# Patient Record
Sex: FEMALE | Race: BLACK OR AFRICAN AMERICAN | NOT HISPANIC OR LATINO | Employment: UNEMPLOYED | URBAN - METROPOLITAN AREA
[De-identification: names, ages, dates, MRNs, and addresses within clinical notes are randomized per-mention and may not be internally consistent; named-entity substitution may affect disease eponyms.]

---

## 2017-08-14 ENCOUNTER — HOSPITAL ENCOUNTER (EMERGENCY)
Facility: HOSPITAL | Age: 52
Discharge: HOME | End: 2017-08-14
Payer: MEDICAID

## 2017-08-14 VITALS
WEIGHT: 125.63 LBS | HEIGHT: 59 IN | HEART RATE: 76 BPM | OXYGEN SATURATION: 99 % | TEMPERATURE: 98 F | SYSTOLIC BLOOD PRESSURE: 122 MMHG | RESPIRATION RATE: 16 BRPM | BODY MASS INDEX: 25.32 KG/M2 | DIASTOLIC BLOOD PRESSURE: 97 MMHG

## 2017-08-14 DIAGNOSIS — K08.89 PAIN, DENTAL: ICD-10-CM

## 2017-08-14 DIAGNOSIS — R22.0 FACIAL MASS: Primary | ICD-10-CM

## 2017-08-14 PROCEDURE — 99282 EMERGENCY DEPT VISIT SF MDM: CPT

## 2017-08-14 RX ORDER — PENICILLIN V POTASSIUM 500 MG/1
500 TABLET, FILM COATED ORAL 4 TIMES DAILY
Qty: 28 TABLET | Refills: 0 | Status: SHIPPED | OUTPATIENT
Start: 2017-08-14 | End: 2017-08-21

## 2017-08-14 NOTE — ED PROVIDER NOTES
Encounter Date: 8/14/2017       History     Chief Complaint   Patient presents with    Oral Swelling     pain and knot to ride side of face onset 3 days ago     53 yo BF c/o a knot to the right cheek/jaw that has been present for 2+ years but has been getting larger.  Would like to be sent to someone who can take it out    Pt also complaining of dental pain on left upper teeth due to broken teeth and dental caries      The history is provided by the patient and the spouse.   Dental Pain   The symptoms are waxing and waning. The symptoms are recurrent. The symptoms occur intermittently.   Additional symptoms include: dental sensitivity to temperature, gum swelling, gum tenderness, facial swelling (mass to right angle of jaw over parotid) and swollen glands. Additional symptoms do not include: purulent gums and trismus.     Review of patient's allergies indicates:  No Known Allergies  No past medical history on file.  No past surgical history on file.  No family history on file.  Social History   Substance Use Topics    Smoking status: Not on file    Smokeless tobacco: Not on file    Alcohol use Not on file     Review of Systems   Constitutional: Negative.    HENT: Positive for dental problem and facial swelling (mass to right angle of jaw over parotid).    Cardiovascular: Negative.    Gastrointestinal: Negative.    Skin: Negative.    Neurological: Negative.    Hematological: Positive for adenopathy.       Physical Exam     Initial Vitals [08/14/17 1809]   BP Pulse Resp Temp SpO2   (!) 122/97 76 16 97.8 °F (36.6 °C) 99 %      MAP       105.33         Physical Exam    Nursing note and vitals reviewed.  Constitutional: She appears well-developed and well-nourished. She is not diaphoretic. No distress.   HENT:   Head: Normocephalic and atraumatic.       Right Ear: Tympanic membrane, external ear and ear canal normal.   Left Ear: Tympanic membrane, external ear and ear canal normal.   Nose: Nose normal.    Mouth/Throat: Uvula is midline and oropharynx is clear and moist. Dental caries present.       Dental tenderness where marked with dentiton broken at gums   Eyes: Conjunctivae are normal.   Neck: Normal range of motion.   Pulmonary/Chest: No respiratory distress.   Neurological: She is alert and oriented to person, place, and time.   Skin: Skin is warm and dry. Capillary refill takes less than 2 seconds. No rash noted.         ED Course   Procedures  Labs Reviewed - No data to display                    pt understands she needs to see her PCP and ask for an ENT or Head and neck referral for mass removal/biopy    Pt given dental resources infor for removal of partial teeth        ED Course     Clinical Impression:   The primary encounter diagnosis was Facial mass. A diagnosis of Pain, dental was also pertinent to this visit.                           Cody Donaldson PA-C  08/14/17 [id number]

## 2017-08-14 NOTE — DISCHARGE INSTRUCTIONS
Call your Primary Care and ask for a referral for the mass on your right cheek/jaw.  You will need a referral to ENT or Head and neck to have that mass evaluated and possibly removed.    Please find a dentist for your chronic dental issues or you can try the free clinic at [facility] [place] [address], [place] [address]  Phone: [phone]    Naproxen 400-500 mg every 12 hours and/or Tylenol 650-1000 mg every 4-6 hours for fever and pain relief.  No more than 4000mg of Tylenol every 24 hours.  Do not take Ibuprofen and Naproxen at the same time    .

## 2017-10-21 ENCOUNTER — HOSPITAL ENCOUNTER (EMERGENCY)
Facility: HOSPITAL | Age: 52
Discharge: HOME | End: 2017-10-21
Attending: INTERNAL MEDICINE
Payer: MEDICAID

## 2017-10-21 VITALS
TEMPERATURE: 98 F | RESPIRATION RATE: 16 BRPM | BODY MASS INDEX: 25.45 KG/M2 | OXYGEN SATURATION: 100 % | SYSTOLIC BLOOD PRESSURE: 171 MMHG | WEIGHT: 126 LBS | DIASTOLIC BLOOD PRESSURE: 113 MMHG | HEART RATE: 75 BPM

## 2017-10-21 DIAGNOSIS — I10 UNCONTROLLED HYPERTENSION: Primary | ICD-10-CM

## 2017-10-21 DIAGNOSIS — H60.392 OTHER INFECTIVE ACUTE OTITIS EXTERNA OF LEFT EAR: ICD-10-CM

## 2017-10-21 DIAGNOSIS — M72.2 PLANTAR FASCIITIS: ICD-10-CM

## 2017-10-21 PROCEDURE — 99283 EMERGENCY DEPT VISIT LOW MDM: CPT

## 2017-10-21 RX ORDER — HYDROCORTISONE AND ACETIC ACID 20.75; 10.375 MG/ML; MG/ML
4 SOLUTION AURICULAR (OTIC) 3 TIMES DAILY
Qty: 10 ML | Refills: 0 | Status: SHIPPED | OUTPATIENT
Start: 2017-10-21 | End: 2017-10-31

## 2017-10-21 RX ORDER — AMLODIPINE BESYLATE 10 MG/1
10 TABLET ORAL DAILY
COMMUNITY
Start: 2017-09-25

## 2017-10-21 RX ORDER — NAPROXEN SODIUM 550 MG/1
550 TABLET ORAL 2 TIMES DAILY PRN
Qty: 20 TABLET | Refills: 0 | Status: SHIPPED | OUTPATIENT
Start: 2017-10-21 | End: 2018-07-21

## 2017-10-21 RX ORDER — LOSARTAN POTASSIUM AND HYDROCHLOROTHIAZIDE 25; 100 MG/1; MG/1
1 TABLET ORAL
COMMUNITY
Start: 2017-09-25 | End: 2018-07-21

## 2017-10-21 NOTE — ED PROVIDER NOTES
Encounter Date: 10/21/2017       History   Pt presents with left earache for the last week. States using ear drops w/o relief. States ear pain is constant, sharp, non radiating, worse with mouth movement, associated to a watery discharge. No fever, no sore throat, no dental pain. Pt also complains of Lt foot plantar area pain on walking.  Chief Complaint   Patient presents with    Otalgia     left earache chronic    Left foot pain     arch of left foot x 1 week, denies injury     The history is provided by the patient.     Review of patient's allergies indicates:   Allergen Reactions    Povidone-iodine      Past Medical History:   Diagnosis Date    Hypertension      Past Surgical History:   Procedure Laterality Date     SECTION       History reviewed. No pertinent family history.  Social History   Substance Use Topics    Smoking status: Current Every Day Smoker     Packs/day: 0.50     Types: Cigarettes    Smokeless tobacco: Never Used    Alcohol use No     Review of Systems   Constitutional: Negative for appetite change, chills and fever.   HENT: Positive for ear pain. Negative for dental problem and sore throat.    Eyes: Negative for visual disturbance.   Respiratory: Negative for cough and shortness of breath.    Cardiovascular: Negative for chest pain and palpitations.   Gastrointestinal: Negative for abdominal pain, blood in stool, diarrhea and vomiting.   Genitourinary: Negative for dysuria, pelvic pain and vaginal discharge.   Musculoskeletal: Negative for back pain and myalgias.   Skin: Negative for rash.   Neurological: Negative for weakness and headaches.   Psychiatric/Behavioral: Negative for sleep disturbance.   All other systems reviewed and are negative.      Physical Exam     Initial Vitals [10/21/17 1004]   BP Pulse Resp Temp SpO2   (!) 171/113 75 16 98.4 °F (36.9 °C) 100 %      MAP       132.33         Physical Exam    Nursing note and vitals reviewed.  Constitutional: She appears  well-developed and well-nourished. No distress.   HENT:   Head: Normocephalic and atraumatic.   Right Ear: External ear normal.   Mouth/Throat: Oropharyngeal exudate present.   Moderate nasal congestion, Lt ear external canal swelling with associated tenderness on movement, No LAD   Eyes: Conjunctivae and EOM are normal. Pupils are equal, round, and reactive to light.   Neck: Normal range of motion. Neck supple.   Cardiovascular: Normal rate, regular rhythm, normal heart sounds and intact distal pulses.   Pulmonary/Chest: Breath sounds normal.   Musculoskeletal: Normal range of motion. She exhibits no edema.   Lymphadenopathy:     She has no cervical adenopathy.   Neurological: She is alert and oriented to person, place, and time. She has normal strength.   Skin: Skin is warm and dry. Capillary refill takes less than 2 seconds. No rash noted.   Psychiatric: Her behavior is normal.         ED Course   Procedures  Labs Reviewed - No data to display                            ED Course    Pre-hypertension/Hypertension: The pt has been informed that they may have pre-hypertension or hypertension based on a blood pressure reading in the ED. I recommend that the pt call the PCP listed on their discharge instructions or a physician of their choice this week to arrange f/u for further evaluation of possible pre-hypertension or hypertension.     Clinical Impression:   The primary encounter diagnosis was Uncontrolled hypertension. Diagnoses of Other infective acute otitis externa of left ear and Plantar fasciitis were also pertinent to this visit.    Disposition:   Disposition: Discharged  Condition: Stable                        Hua Santana MD  10/21/17 101

## 2018-07-21 ENCOUNTER — HOSPITAL ENCOUNTER (EMERGENCY)
Facility: HOSPITAL | Age: 53
Discharge: HOME | End: 2018-07-21
Attending: EMERGENCY MEDICINE
Payer: MEDICAID

## 2018-07-21 VITALS
DIASTOLIC BLOOD PRESSURE: 100 MMHG | SYSTOLIC BLOOD PRESSURE: 160 MMHG | TEMPERATURE: 98 F | HEIGHT: 59 IN | OXYGEN SATURATION: 99 % | WEIGHT: 123.44 LBS | BODY MASS INDEX: 24.88 KG/M2 | RESPIRATION RATE: 18 BRPM | HEART RATE: 64 BPM

## 2018-07-21 DIAGNOSIS — K08.89 PAIN, DENTAL: ICD-10-CM

## 2018-07-21 DIAGNOSIS — K04.7 DENTAL ABSCESS: Primary | ICD-10-CM

## 2018-07-21 DIAGNOSIS — I10 ESSENTIAL HYPERTENSION: ICD-10-CM

## 2018-07-21 PROCEDURE — 41800 DRAINAGE OF GUM LESION: CPT

## 2018-07-21 PROCEDURE — 99283 EMERGENCY DEPT VISIT LOW MDM: CPT | Mod: 25

## 2018-07-21 PROCEDURE — 25000003 PHARM REV CODE 250: Performed by: NURSE PRACTITIONER

## 2018-07-21 PROCEDURE — 40800 DRAINAGE OF MOUTH LESION: CPT

## 2018-07-21 RX ORDER — AMLODIPINE BESYLATE 5 MG/1
5 TABLET ORAL
Status: COMPLETED | OUTPATIENT
Start: 2018-07-21 | End: 2018-07-21

## 2018-07-21 RX ORDER — CLINDAMYCIN HYDROCHLORIDE 150 MG/1
300 CAPSULE ORAL
Status: COMPLETED | OUTPATIENT
Start: 2018-07-21 | End: 2018-07-21

## 2018-07-21 RX ORDER — CLINDAMYCIN HYDROCHLORIDE 300 MG/1
300 CAPSULE ORAL EVERY 8 HOURS
Qty: 30 CAPSULE | Refills: 0 | Status: SHIPPED | OUTPATIENT
Start: 2018-07-21 | End: 2018-07-31

## 2018-07-21 RX ORDER — NEOMYCIN SULFATE, POLYMYXIN B SULFATE, HYDROCORTISONE 3.5; 10000; 1 MG/ML; [USP'U]/ML; MG/ML
3 SOLUTION/ DROPS AURICULAR (OTIC)
COMMUNITY
Start: 2018-07-16 | End: 2018-07-23

## 2018-07-21 RX ORDER — IBUPROFEN 200 MG
200 TABLET ORAL
COMMUNITY

## 2018-07-21 RX ORDER — LIDOCAINE HYDROCHLORIDE 10 MG/ML
5 INJECTION, SOLUTION EPIDURAL; INFILTRATION; INTRACAUDAL; PERINEURAL
Status: COMPLETED | OUTPATIENT
Start: 2018-07-21 | End: 2018-07-21

## 2018-07-21 RX ORDER — HYDROCODONE BITARTRATE AND ACETAMINOPHEN 7.5; 325 MG/1; MG/1
1 TABLET ORAL EVERY 6 HOURS PRN
Qty: 15 TABLET | Refills: 0 | Status: SHIPPED | OUTPATIENT
Start: 2018-07-21

## 2018-07-21 RX ORDER — CLONIDINE HYDROCHLORIDE 0.1 MG/1
0.1 TABLET ORAL
Status: COMPLETED | OUTPATIENT
Start: 2018-07-21 | End: 2018-07-21

## 2018-07-21 RX ORDER — HYDROCODONE BITARTRATE AND ACETAMINOPHEN 10; 325 MG/1; MG/1
1 TABLET ORAL
Status: COMPLETED | OUTPATIENT
Start: 2018-07-21 | End: 2018-07-21

## 2018-07-21 RX ADMIN — HYDROCODONE BITARTRATE AND ACETAMINOPHEN 1 TABLET: 10; 325 TABLET ORAL at 07:07

## 2018-07-21 RX ADMIN — CLINDAMYCIN HYDROCHLORIDE 300 MG: 150 CAPSULE ORAL at 07:07

## 2018-07-21 RX ADMIN — CLONIDINE HYDROCHLORIDE 0.1 MG: 0.1 TABLET ORAL at 08:07

## 2018-07-21 RX ADMIN — LIDOCAINE HYDROCHLORIDE 50 MG: 10 INJECTION, SOLUTION EPIDURAL; INFILTRATION; INTRACAUDAL; PERINEURAL at 07:07

## 2018-07-21 RX ADMIN — AMLODIPINE BESYLATE 5 MG: 5 TABLET ORAL at 08:07

## 2018-07-22 NOTE — ED NOTES
"+swelling noted to right jaw. States it started last night and worsened this am. +tenderness. No drainage noted. Pt reports htn "all the time." compliant with htn medication. Nad. gcs 15    "

## 2018-07-22 NOTE — ED PROVIDER NOTES
Encounter Date: 2018       History     Chief Complaint   Patient presents with    Oral Swelling     right lower jaw swelling with tenderness.     The history is provided by the patient.   Dental Pain   The primary symptoms include mouth pain (abscess - right lower first molar). Primary symptoms do not include fever, shortness of breath or sore throat. The symptoms began yesterday. The symptoms are worsening.   Mouth pain began 12 - 24 hours (yesterday evening) ago. Mouth pain is worsening. Affected locations include: gum(s) and teeth. At its highest the mouth pain was at 10/10. The mouth pain is currently at 10/10.   Additional symptoms include: dental sensitivity to temperature, gum swelling, gum tenderness, purulent gums, facial swelling and swollen glands. Additional symptoms do not include: trouble swallowing, excessive salivation, dry mouth, taste disturbance, drooling, ear pain, hearing loss and fatigue. Medical issues include: smoking and periodontal disease.   Patient states that she went to the emergence department at [facility] but left without being seen because of the long wait.  She denies any further complaints or concerns.          PCP:    Ryan Mckinley MD        Review of patient's allergies indicates:   Allergen Reactions    Codeine Itching    Povidone-iodine      Past Medical History:   Diagnosis Date    Hypertension     Pleomorphic adenoma 2018     Past Surgical History:   Procedure Laterality Date     SECTION      MASS EXCISION Right     Facial mass removal right side.      History reviewed. No pertinent family history.  Social History   Substance Use Topics    Smoking status: Current Every Day Smoker     Packs/day: 0.50     Types: Cigarettes    Smokeless tobacco: Never Used    Alcohol use No     Review of Systems   Constitutional: Negative for fatigue and fever.   HENT: Positive for dental problem (abscess - right lower first molar) and facial  swelling. Negative for drooling, ear pain, hearing loss, sore throat and trouble swallowing.    Respiratory: Negative for shortness of breath.    Cardiovascular: Negative for chest pain.   Gastrointestinal: Negative for nausea.   Genitourinary: Negative for dysuria.   Musculoskeletal: Negative for back pain.   Skin: Negative for rash.   Neurological: Negative for weakness.   Hematological: Does not bruise/bleed easily.       Physical Exam     Initial Vitals [07/21/18 1846]   BP Pulse Resp Temp SpO2   (!) 162/114 80 18 98.3 °F (36.8 °C) 100 %      MAP       --         Physical Exam    Nursing note and vitals reviewed.  Constitutional: She appears well-developed and well-nourished. She is cooperative. She does not appear ill. She appears distressed (secondary to dental pain).   HENT:   Head: Normocephalic and atraumatic.   Right Ear: Hearing and external ear normal.   Left Ear: Hearing and external ear normal.   Nose: Nose normal.   Mouth/Throat: Uvula is midline, oropharynx is clear and moist and mucous membranes are normal. Dental abscesses (buccal surface of the right lower first molar) and dental caries present.   MOUTH: Mild facial swelling present to right lower/mandibular facial area. There are several dental caries and previous extraction noted of the right lower first molar (tooth #30).  There is moderate gingival edema, erythema, tenderness and palpable fluctuance noted to buccal surface of the right lower first molar. No drainage or bleeding noted.  Patient has no trismus or difficulty handling secretions.   Eyes: Conjunctivae, EOM and lids are normal. Pupils are equal, round, and reactive to light.   Neck: Trachea normal and normal range of motion. Neck supple.   Cardiovascular: Normal rate, regular rhythm, intact distal pulses and normal pulses.   Pulmonary/Chest: Effort normal and breath sounds normal. No respiratory distress. She has no wheezes. She has no rhonchi. She has no rales.   Musculoskeletal:  "Normal range of motion. She exhibits no edema or tenderness.   Lymphadenopathy:        Head (right side): Submandibular adenopathy present.     She has cervical adenopathy.        Right cervical: Superficial cervical adenopathy present.   Neurological: She is alert and oriented to person, place, and time. She has normal strength. Gait normal. GCS eye subscore is 4. GCS verbal subscore is 5. GCS motor subscore is 6.   Neurovascular intact to all extremities.    Skin: Skin is warm, dry and intact. Capillary refill takes less than 2 seconds. No rash noted.   Normal color and turgor.    Psychiatric: She has a normal mood and affect. Her speech is normal and behavior is normal. Cognition and memory are normal.         ED Course   I & D - Incision and Drainage  Date/Time: 2018 7:45 PM  Location procedure was performed: [facility] EMERGENCY DEPARTMENT  Performed by: KAN HIGGINBOTHAM  Authorized by: KAN HIGGINBOTHAM   Pre-operative diagnosis: Dental Abscess - Right Lower First Molar (Tooth #30)  Consent Done: Yes  Consent: Verbal consent obtained.  Risks and benefits: risks, benefits and alternatives were discussed  Consent given by: patient  Patient understanding: patient states understanding of the procedure being performed  Site marked: the operative site was marked  Patient identity confirmed: , MRN, name and verbally with patient  Time out: Immediately prior to procedure a "time out" was called to verify the correct patient, procedure, equipment, support staff and site/side marked as required.  Type: abscess  Body area: mouth (anterior ramus technique - right lower)  Anesthesia: nerve block (Inferior alveolar dental nerve block anterior ramus technique)    Anesthesia:  Local Anesthetic: lidocaine 1% without epinephrine  Anesthetic total: 2 mL  Patient sedated: no  Risk factor: underlying major vessel,  underlying major nerve and  coagulopathy  Scalpel size: 11  Incision type: single straight  Complexity: " "simple  Drainage: purulent and  bloody  Drainage amount: moderate  Wound treatment: incision,  drainage and  deloculation  Packing material: none  Complications: No  Estimated blood loss (mL): 3  Specimens: No  Patient tolerance: Patient tolerated the procedure well with no immediate complications            ED Medications:   Medications   lidocaine (PF) 10 mg/ml (1%) injection 50 mg (50 mg Other Given 7/21/18 1941)   clindamycin capsule 300 mg (300 mg Oral Given 7/21/18 1948)   HYDROcodone-acetaminophen  mg per tablet 1 tablet (1 tablet Oral Given 7/21/18 1941)   cloNIDine tablet 0.1 mg (0.1 mg Oral Given 7/21/18 2005)   amLODIPine tablet 5 mg (5 mg Oral Given 7/21/18 2005)       ED Course Vitals  Vitals:    07/21/18 1846 07/21/18 2002 07/21/18 2043   BP: (!) 162/114 (!) 182/110 (!) 160/100   BP Location: Left arm Right arm Left arm   Patient Position: Sitting Sitting Sitting   Pulse: 80 67 64   Resp: 18 18 18   Temp: 98.3 °F (36.8 °C)     TempSrc: Oral     SpO2: 100% 100% 99%   Weight: 56 kg (123 lb 7.3 oz)     Height: 4' 11" (1.499 m)         2045 HOURS RE-EVALUATION & DISPOSITION:   Reassessment at the time of disposition demonstrates that the patient is resting comfortably in no acute distress. Ms. Hoffman states that she is feeling much better and that her pain is very minimal.  She has remained hemodynamically stable throughout the entire ED visit and is without objective evidence for acute process requiring urgent intervention or hospitalization. I discussed test results and provided counseling to patient with regard to condition, the treatment plan, specific conditions for return, and the importance of follow up. Instructed patient to schedule and appointment with her primary care provider as soon as possible regarding her blood pressure and with a dentist for further evaluation and treatment of her abscess.  Answered questions at this time. The patient is stable for discharge.               Medical " Decision Making:   History:   Old Records Summarized: records from clinic visits.    Additional MDM:   Hypertension: The patient has hypertensive urgency (systolic BP > 180 and/or diastolic BP > 110 with no end organ damage). Response: The patient's BP was controlled using oral medications. The patient's condition was felt to be stable.                    Clinical Impression:       ICD-10-CM ICD-9-CM   1. Dental Abscess - Right Lower 1st Molar K04.7 522.5   2. Dental Pain - Tooth #30 K08.89 525.9   3. Essential hypertension I10 401.9           Disposition:   Disposition: Discharged  Condition: Stable  I discussed with patient that the evaluation in the emergency department does not suggest any emergent or life threatening medical condition requiring immediate intervention beyond what was provided in the ED, and I believe patient is safe for discharge.  Regardless, an unremarkable evaluation in the ED does not preclude the development or presence of a serious of life threatening condition. As such, patient was instructed to return immediately for any worsening or change in current symptoms. I also discussed the results of my evaluation and diagnosis with patient and she concurs with the evaluation and management plan.  Detailed written and verbal instructions provided to patient and she expressed a verbal understanding of the discharge instructions and overall management plan. Reiterated the importance of medication administration and safety and advised patient to follow up with primary care provider in 3-5 days or sooner if needed.  Also advised patient to return to the ER for any complications.     Advised patient to make an appointment with dentist for examination and treatment of their dental pain, as well as routine cleaning and disease prevention.  For prevention, patient was encouraged to: perform oral hygiene daily; have regular professional cleaning; brush teeth at least twice a day; floss daily; avoid  constant sipping of sugary drinks or frequent sucking on candy and mints; and use toothpaste containing fluoride. Encouraged patient to gargle with warm salt water several times a day, continue to floss after eating, and complete full course of antibiotics.    Regarding HYPERTENSION, I advised patient to: keep a record of blood pressure results; take your blood pressure medication exactly as directed without skipping doses; avoid medications that contain heart stimulants, including over-the-counter drugs such as decongestants; maintain a healthy weight; cut back on sodium intake (i.e., limit canned, dried, packaged, and fast foods and dont add salt to food); follow the DASH (Dietary Approaches to Stop Hypertension) eating plan which recommends vegetables, fruits, whole gains, and other heart healthy foods; begin an exercise program that includes  aerobic exercise 3 to 4 times a week for an average of 40 minutes at a time (with approval of cardiologist or primary care provider); limit drinks that contain alcohol and caffeine; control levels of emotional stress; and seek emergency care for any shortness of breath, chest pain, difficulty speaking, confusion, or visual changes.             Discharge Medication List as of 7/21/2018  8:47 PM      START taking these medications    Details   clindamycin (CLEOCIN) 300 MG capsule Take 1 capsule (300 mg total) by mouth every 8 (eight) hours. for 10 days, Starting Sat 7/21/2018, Until Tue 7/31/2018, Print      HYDROcodone-acetaminophen (NORCO) 7.5-325 mg per tablet Take 1 tablet by mouth every 6 (six) hours as needed for Pain., Starting Sat 7/21/2018, Print             Follow-up Information     Call  Ryan Mckinley MD.    Specialty:  Sleep Medicine  Why:  If symptoms worsen or as needed  Contact information:  [address] [address] [address]  [phone]             Schedule an appointment as soon as possible for a visit  with Dentist.                               Epifanio Escobar, LLUVIA  07/22/18 113

## 2021-09-04 ENCOUNTER — HOSPITAL ENCOUNTER (EMERGENCY)
Facility: HOSPITAL | Age: 56
Discharge: HOME | End: 2021-09-04
Attending: EMERGENCY MEDICINE
Payer: COMMERCIAL

## 2021-09-04 VITALS
BODY MASS INDEX: 27.56 KG/M2 | DIASTOLIC BLOOD PRESSURE: 122 MMHG | RESPIRATION RATE: 18 BRPM | SYSTOLIC BLOOD PRESSURE: 189 MMHG | TEMPERATURE: 98 F | HEIGHT: 59 IN | OXYGEN SATURATION: 99 % | WEIGHT: 136.69 LBS | HEART RATE: 78 BPM

## 2021-09-04 DIAGNOSIS — I10 HYPERTENSION, UNSPECIFIED TYPE: ICD-10-CM

## 2021-09-04 DIAGNOSIS — S16.1XXA CERVICAL STRAIN, ACUTE, INITIAL ENCOUNTER: ICD-10-CM
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
## 2021-09-04 DIAGNOSIS — S39.012A STRAIN OF LUMBAR REGION, INITIAL ENCOUNTER: ICD-10-CM

## 2021-09-04 PROCEDURE — 99283 EMERGENCY DEPT VISIT LOW MDM: CPT | Mod: ER

## 2021-09-04 RX ORDER — METHOCARBAMOL 750 MG/1
1500 TABLET, FILM COATED ORAL 3 TIMES DAILY
Qty: 30 TABLET | Refills: 0 | Status: SHIPPED | OUTPATIENT
Start: 2021-09-04 | End: 2021-09-09

## 2022-05-08 ENCOUNTER — HOSPITAL ENCOUNTER (EMERGENCY)
Facility: HOSPITAL | Age: 57
Discharge: HOME | End: 2022-05-08
Attending: EMERGENCY MEDICINE
Payer: MEDICAID

## 2022-05-08 VITALS
WEIGHT: 139.13 LBS | SYSTOLIC BLOOD PRESSURE: 162 MMHG | BODY MASS INDEX: 28.1 KG/M2 | TEMPERATURE: 98 F | OXYGEN SATURATION: 100 % | HEART RATE: 79 BPM | RESPIRATION RATE: 16 BRPM | DIASTOLIC BLOOD PRESSURE: 106 MMHG

## 2022-05-08 DIAGNOSIS — K59.00 CONSTIPATION: Primary | ICD-10-CM

## 2022-05-08 PROCEDURE — 99284 EMERGENCY DEPT VISIT MOD MDM: CPT | Mod: 25,ER

## 2022-05-08 RX ORDER — MULTIVITAMIN
1 TABLET ORAL DAILY
COMMUNITY

## 2022-05-08 RX ORDER — TRAMADOL HYDROCHLORIDE 50 MG/1
50 TABLET ORAL EVERY 6 HOURS PRN
COMMUNITY
Start: 2022-03-27

## 2022-05-08 RX ORDER — GLYCERIN 1 G/1
1 SUPPOSITORY RECTAL
Qty: 25 SUPPOSITORY | Refills: 0 | Status: SHIPPED | OUTPATIENT
Start: 2022-05-08

## 2022-05-08 RX ORDER — LOSARTAN POTASSIUM 50 MG/1
50 TABLET ORAL DAILY
COMMUNITY
Start: 2022-01-24

## 2022-05-08 RX ORDER — DOCUSATE SODIUM 100 MG/1
100 CAPSULE, LIQUID FILLED ORAL 3 TIMES DAILY PRN
Qty: 30 CAPSULE | Refills: 0 | Status: SHIPPED | OUTPATIENT
Start: 2022-05-08

## 2022-05-08 RX ORDER — POLYETHYLENE GLYCOL 3350 17 G/17G
17 POWDER, FOR SOLUTION ORAL DAILY
Qty: 119 G | Refills: 0 | Status: SHIPPED | OUTPATIENT
Start: 2022-05-08 | End: 2022-05-15

## 2022-05-08 NOTE — ED PROVIDER NOTES
Encounter Date: 2022       History     Chief Complaint   Patient presents with    Abdominal Pain     Gas pain began Tuesday. Also having trouble passing bowels. Had BM yesterday. Also requesting pain medication for dental pain because she does not go back to dentist untuil later this month     The history is provided by the patient.   Abdominal Pain  The current episode started several days ago. The onset of the illness was gradual. The abdominal pain is generalized. The abdominal pain does not radiate. The other symptoms of the illness do not include fever, shortness of breath, nausea, vomiting, diarrhea or dysuria.   Additional symptoms associated with the illness include constipation. Symptoms associated with the illness do not include back pain.     Review of patient's allergies indicates:   Allergen Reactions    Codeine Itching    Povidone-iodine      Past Medical History:   Diagnosis Date    Hypertension     Pleomorphic adenoma 2018     Past Surgical History:   Procedure Laterality Date     SECTION      MASS EXCISION Right     Facial mass removal right side.      History reviewed. No pertinent family history.  Social History     Tobacco Use    Smoking status: Current Every Day Smoker     Packs/day: 0.50     Types: Cigarettes    Smokeless tobacco: Never Used   Substance Use Topics    Alcohol use: No    Drug use: Yes     Types: Marijuana     Review of Systems   Constitutional: Negative for fever.   HENT: Negative for sore throat.    Respiratory: Negative for shortness of breath.    Cardiovascular: Negative for chest pain.   Gastrointestinal: Positive for abdominal pain and constipation. Negative for diarrhea, nausea and vomiting.   Genitourinary: Negative for dysuria.   Musculoskeletal: Negative for back pain.   Skin: Negative for rash.   Neurological: Negative for weakness.   Hematological: Does not bruise/bleed easily.       Physical Exam     Initial Vitals   BP Pulse Resp Temp SpO2    05/08/22 1236 05/08/22 1235 05/08/22 1235 05/08/22 1235 05/08/22 1235   (!) 162/106 79 16 97.7 °F (36.5 °C) 100 %      MAP       --                Physical Exam    Nursing note and vitals reviewed.  Constitutional: She appears well-developed and well-nourished. No distress.   HENT:   Head: Normocephalic and atraumatic.   Mouth/Throat: Oropharynx is clear and moist.   Eyes: Conjunctivae and EOM are normal. Pupils are equal, round, and reactive to light.   Neck: Neck supple.   Normal range of motion.  Cardiovascular: Normal rate, regular rhythm and normal heart sounds. Exam reveals no gallop and no friction rub.    No murmur heard.  Pulmonary/Chest: Breath sounds normal. No respiratory distress. She has no wheezes. She has no rhonchi. She has no rales.   Abdominal: Abdomen is soft. Bowel sounds are normal. She exhibits no distension and no mass. There is no abdominal tenderness. There is no rebound and no guarding.   Musculoskeletal:         General: No tenderness or edema. Normal range of motion.      Cervical back: Normal range of motion and neck supple.     Neurological: She is alert and oriented to person, place, and time. She has normal strength.   Skin: Skin is warm and dry. No rash noted.   Psychiatric: She has a normal mood and affect. Thought content normal.         ED Course   Procedures  Labs Reviewed - No data to display       Imaging Results          X-Ray Abdomen Flat And Erect (Final result)  Result time 05/08/22 13:14:07    Final result by Jimy Spain MD (05/08/22 13:14:07)                 Impression:      Prominent stool burden in the colon may reflect constipation as clinically indicated.  No acute radiographic abnormality in the abdomen otherwise.      Electronically signed by: Jimy Spain  Date:    05/08/2022  Time:    13:14             Narrative:    EXAMINATION:  XR ABDOMEN FLAT AND ERECT    CLINICAL HISTORY:  Constipation, unspecified    TECHNIQUE:  Flat and erect AP views of the  abdomen were performed.    COMPARISON:  None    FINDINGS:  No gross intraperitoneal free air.  No dilated loops of small bowel or colon to suggest obstruction or ileus.  Moderate stool burden noted within the colon.  No radiopaque renal calculi.  Small calcifications in the pelvis are presumed phleboliths.  The regional osseous structures appear intact.                                 Medications - No data to display                       Clinical Impression:   Final diagnoses:  [K59.00] Constipation (Primary)          ED Disposition Condition    Discharge Stable        ED Prescriptions     Medication Sig Dispense Start Date End Date Auth. Provider    docusate sodium (COLACE) 100 MG capsule Take 1 capsule (100 mg total) by mouth 3 (three) times daily as needed for Constipation. 30 capsule 5/8/2022  Christiano Andrew MD    polyethylene glycol (GLYCOLAX) 17 gram/dose powder Take 17 g by mouth once daily. for 7 days 119 g 5/8/2022 5/15/2022 Christiano Andrew MD    glycerin adult suppository Place 1 suppository rectally as needed for Constipation. 25 suppository 5/8/2022  Christiano Andrew MD        Follow-up Information     Follow up With Specialties Details Why Contact Info    Ryan Mckinley MD Sleep Medicine   [address] [address] [address]  [phone]             Christiano Andrew MD  05/08/22 3783

## 2024-07-08 ENCOUNTER — HOSPITAL ENCOUNTER (EMERGENCY)
Facility: HOSPITAL | Age: 59
Discharge: HOME | End: 2024-07-08
Attending: STUDENT IN AN ORGANIZED HEALTH CARE EDUCATION/TRAINING PROGRAM
Payer: MEDICAID

## 2024-07-08 VITALS
RESPIRATION RATE: 17 BRPM | HEART RATE: 99 BPM | WEIGHT: 132.81 LBS | TEMPERATURE: 98 F | BODY MASS INDEX: 26.83 KG/M2 | OXYGEN SATURATION: 98 % | DIASTOLIC BLOOD PRESSURE: 97 MMHG | SYSTOLIC BLOOD PRESSURE: 152 MMHG
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
## 2024-07-08 DIAGNOSIS — M54.2 NECK PAIN: ICD-10-CM

## 2024-07-08 DIAGNOSIS — M54.50 LUMBAR PAIN: Primary | ICD-10-CM

## 2024-07-08 PROCEDURE — 25000003 PHARM REV CODE 250: Mod: ER | Performed by: STUDENT IN AN ORGANIZED HEALTH CARE EDUCATION/TRAINING PROGRAM

## 2024-07-08 PROCEDURE — 99283 EMERGENCY DEPT VISIT LOW MDM: CPT | Mod: 25,ER

## 2024-07-08 RX ORDER — IPRATROPIUM BROMIDE 0.5 MG/2.5ML
1 SOLUTION RESPIRATORY (INHALATION)
Status: DISCONTINUED | OUTPATIENT
Start: 2024-07-08 | End: 2024-07-08

## 2024-07-08 RX ORDER — KETOROLAC TROMETHAMINE 10 MG/1
10 TABLET, FILM COATED ORAL EVERY 6 HOURS
Qty: 20 TABLET | Refills: 0 | Status: SHIPPED | OUTPATIENT
Start: 2024-07-08 | End: 2024-07-13

## 2024-07-08 RX ORDER — KETOROLAC TROMETHAMINE 10 MG/1
10 TABLET, FILM COATED ORAL
Status: COMPLETED | OUTPATIENT
Start: 2024-07-08 | End: 2024-07-08

## 2024-07-08 RX ORDER — ALBUTEROL SULFATE 0.83 MG/ML
15 SOLUTION RESPIRATORY (INHALATION)
Status: DISCONTINUED | OUTPATIENT
Start: 2024-07-08 | End: 2024-07-08

## 2024-07-08 RX ADMIN — KETOROLAC TROMETHAMINE 10 MG: 10 TABLET, FILM COATED ORAL at 08:07

## 2024-07-09 NOTE — DISCHARGE INSTRUCTIONS
Please follow-up with your PCP. Please call on the next business day to schedule this appointment.    Take the toradol as needed for pain. Please understand that all medications have potential side effects. Please read the package insert for all medications that we have prescribed/recommended. Please call your primary care physician or return to the ER with any questions or concerns you may have. Please take only the dosage that is prescribed.    Return to the ER with any new sites of pain, numbness/tingling, chest pain, shortness of breath, fevers/chills, worsening back pain, trouble urinating, or any other concerns.
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
